# Patient Record
Sex: MALE | Race: BLACK OR AFRICAN AMERICAN | Employment: FULL TIME | URBAN - METROPOLITAN AREA
[De-identification: names, ages, dates, MRNs, and addresses within clinical notes are randomized per-mention and may not be internally consistent; named-entity substitution may affect disease eponyms.]

---

## 2017-11-08 ENCOUNTER — ANESTHESIA EVENT (OUTPATIENT)
Dept: MEDSURG UNIT | Age: 52
End: 2017-11-08
Payer: COMMERCIAL

## 2017-11-09 ENCOUNTER — ANESTHESIA (OUTPATIENT)
Dept: MEDSURG UNIT | Age: 52
End: 2017-11-09
Payer: COMMERCIAL

## 2017-11-09 ENCOUNTER — HOSPITAL ENCOUNTER (OUTPATIENT)
Age: 52
Setting detail: OUTPATIENT SURGERY
Discharge: HOME | End: 2017-11-09
Attending: PODIATRIST | Admitting: PODIATRIST
Payer: COMMERCIAL

## 2017-11-09 VITALS
HEIGHT: 70 IN | RESPIRATION RATE: 16 BRPM | DIASTOLIC BLOOD PRESSURE: 79 MMHG | TEMPERATURE: 97.6 F | HEART RATE: 55 BPM | WEIGHT: 250 LBS | SYSTOLIC BLOOD PRESSURE: 123 MMHG | BODY MASS INDEX: 35.79 KG/M2 | OXYGEN SATURATION: 98 %

## 2017-11-09 PROBLEM — M20.42 HAMMER TOE OF SECOND TOE OF LEFT FOOT: Status: RESOLVED | Noted: 2017-11-09 | Resolved: 2017-11-09

## 2017-11-09 PROBLEM — M20.42 HAMMER TOE OF SECOND TOE OF LEFT FOOT: Status: ACTIVE | Noted: 2017-11-09

## 2017-11-09 LAB
ATRIAL RATE: 61 BPM
CALCULATED P AXIS, ECG09: 64 DEGREES
CALCULATED R AXIS, ECG10: 31 DEGREES
CALCULATED T AXIS, ECG11: 16 DEGREES
DIAGNOSIS, 93000: NORMAL
HGB BLD-MCNC: 12.8 G/DL (ref 12.1–17)
P-R INTERVAL, ECG05: 162 MS
Q-T INTERVAL, ECG07: 388 MS
QRS DURATION, ECG06: 84 MS
QTC CALCULATION (BEZET), ECG08: 390 MS
VENTRICULAR RATE, ECG03: 61 BPM

## 2017-11-09 PROCEDURE — 77030002916 HC SUT ETHLN J&J -A: Performed by: PODIATRIST

## 2017-11-09 PROCEDURE — 74011000250 HC RX REV CODE- 250

## 2017-11-09 PROCEDURE — 74011250636 HC RX REV CODE- 250/636

## 2017-11-09 PROCEDURE — 76210000050 HC AMBSU PH II REC 0.5 TO 1 HR: Performed by: PODIATRIST

## 2017-11-09 PROCEDURE — 74011250636 HC RX REV CODE- 250/636: Performed by: ANESTHESIOLOGY

## 2017-11-09 PROCEDURE — 76210000034 HC AMBSU PH I REC 0.5 TO 1 HR: Performed by: PODIATRIST

## 2017-11-09 PROCEDURE — 74011250636 HC RX REV CODE- 250/636: Performed by: PODIATRIST

## 2017-11-09 PROCEDURE — 77030009023 HC CAP PROTCT PIN MCRA -A: Performed by: PODIATRIST

## 2017-11-09 PROCEDURE — 77030018836 HC SOL IRR NACL ICUM -A: Performed by: PODIATRIST

## 2017-11-09 PROCEDURE — 77030010396 HC WRE FIX C CNMD -A: Performed by: PODIATRIST

## 2017-11-09 PROCEDURE — 77030020782 HC GWN BAIR PAWS FLX 3M -B

## 2017-11-09 PROCEDURE — 93005 ELECTROCARDIOGRAM TRACING: CPT

## 2017-11-09 PROCEDURE — 77030031139 HC SUT VCRL2 J&J -A: Performed by: PODIATRIST

## 2017-11-09 PROCEDURE — 74011000250 HC RX REV CODE- 250: Performed by: PODIATRIST

## 2017-11-09 PROCEDURE — 77030006831 HC BLD SAW SAG MCRA -B: Performed by: PODIATRIST

## 2017-11-09 PROCEDURE — C1713 ANCHOR/SCREW BN/BN,TIS/BN: HCPCS | Performed by: PODIATRIST

## 2017-11-09 PROCEDURE — 85018 HEMOGLOBIN: CPT

## 2017-11-09 PROCEDURE — 77030020754 HC CUF TRNQT 2BLA STRY -B: Performed by: PODIATRIST

## 2017-11-09 PROCEDURE — 76060000061 HC AMB SURG ANES 0.5 TO 1 HR: Performed by: PODIATRIST

## 2017-11-09 PROCEDURE — 76030000000 HC AMB SURG OR TIME 0.5 TO 1: Performed by: PODIATRIST

## 2017-11-09 PROCEDURE — 77030036687 HC SHOE PSTOP S2SG -A

## 2017-11-09 DEVICE — IMPLANTABLE DEVICE: Type: IMPLANTABLE DEVICE | Status: FUNCTIONAL

## 2017-11-09 RX ORDER — ONDANSETRON 2 MG/ML
INJECTION INTRAMUSCULAR; INTRAVENOUS AS NEEDED
Status: DISCONTINUED | OUTPATIENT
Start: 2017-11-09 | End: 2017-11-09 | Stop reason: HOSPADM

## 2017-11-09 RX ORDER — ONDANSETRON 2 MG/ML
4 INJECTION INTRAMUSCULAR; INTRAVENOUS AS NEEDED
Status: DISCONTINUED | OUTPATIENT
Start: 2017-11-09 | End: 2017-11-09 | Stop reason: HOSPADM

## 2017-11-09 RX ORDER — AMLODIPINE BESYLATE 5 MG/1
5 TABLET ORAL DAILY
COMMUNITY

## 2017-11-09 RX ORDER — SODIUM CHLORIDE 0.9 % (FLUSH) 0.9 %
5-10 SYRINGE (ML) INJECTION AS NEEDED
Status: DISCONTINUED | OUTPATIENT
Start: 2017-11-09 | End: 2017-11-09 | Stop reason: HOSPADM

## 2017-11-09 RX ORDER — AMLODIPINE, VALSARTAN AND HYDROCHLOROTHIAZIDE 10; 160; 12.5 MG/1; MG/1; MG/1
TABLET ORAL
Status: ON HOLD | COMMUNITY
End: 2017-11-09

## 2017-11-09 RX ORDER — LIDOCAINE HYDROCHLORIDE 10 MG/ML
0.1 INJECTION, SOLUTION EPIDURAL; INFILTRATION; INTRACAUDAL; PERINEURAL AS NEEDED
Status: DISCONTINUED | OUTPATIENT
Start: 2017-11-09 | End: 2017-11-09 | Stop reason: HOSPADM

## 2017-11-09 RX ORDER — FENTANYL CITRATE 50 UG/ML
25 INJECTION, SOLUTION INTRAMUSCULAR; INTRAVENOUS
Status: DISCONTINUED | OUTPATIENT
Start: 2017-11-09 | End: 2017-11-09 | Stop reason: HOSPADM

## 2017-11-09 RX ORDER — SODIUM CHLORIDE 0.9 % (FLUSH) 0.9 %
5-10 SYRINGE (ML) INJECTION EVERY 8 HOURS
Status: DISCONTINUED | OUTPATIENT
Start: 2017-11-09 | End: 2017-11-09 | Stop reason: HOSPADM

## 2017-11-09 RX ORDER — HYDROMORPHONE HYDROCHLORIDE 1 MG/ML
0.2 INJECTION, SOLUTION INTRAMUSCULAR; INTRAVENOUS; SUBCUTANEOUS
Status: DISCONTINUED | OUTPATIENT
Start: 2017-11-09 | End: 2017-11-09 | Stop reason: HOSPADM

## 2017-11-09 RX ORDER — LISINOPRIL 10 MG/1
TABLET ORAL DAILY
COMMUNITY

## 2017-11-09 RX ORDER — SODIUM CHLORIDE, SODIUM LACTATE, POTASSIUM CHLORIDE, CALCIUM CHLORIDE 600; 310; 30; 20 MG/100ML; MG/100ML; MG/100ML; MG/100ML
50 INJECTION, SOLUTION INTRAVENOUS CONTINUOUS
Status: DISCONTINUED | OUTPATIENT
Start: 2017-11-09 | End: 2017-11-09 | Stop reason: HOSPADM

## 2017-11-09 RX ORDER — OXYCODONE AND ACETAMINOPHEN 5; 325 MG/1; MG/1
1 TABLET ORAL
Qty: 20 TAB | Refills: 0 | Status: SHIPPED | OUTPATIENT
Start: 2017-11-09

## 2017-11-09 RX ORDER — MIDAZOLAM HYDROCHLORIDE 1 MG/ML
INJECTION, SOLUTION INTRAMUSCULAR; INTRAVENOUS AS NEEDED
Status: DISCONTINUED | OUTPATIENT
Start: 2017-11-09 | End: 2017-11-09 | Stop reason: HOSPADM

## 2017-11-09 RX ORDER — PROPOFOL 10 MG/ML
INJECTION, EMULSION INTRAVENOUS
Status: DISCONTINUED | OUTPATIENT
Start: 2017-11-09 | End: 2017-11-09 | Stop reason: HOSPADM

## 2017-11-09 RX ORDER — PROPOFOL 10 MG/ML
INJECTION, EMULSION INTRAVENOUS AS NEEDED
Status: DISCONTINUED | OUTPATIENT
Start: 2017-11-09 | End: 2017-11-09 | Stop reason: HOSPADM

## 2017-11-09 RX ORDER — LIDOCAINE HYDROCHLORIDE 20 MG/ML
INJECTION, SOLUTION EPIDURAL; INFILTRATION; INTRACAUDAL; PERINEURAL AS NEEDED
Status: DISCONTINUED | OUTPATIENT
Start: 2017-11-09 | End: 2017-11-09 | Stop reason: HOSPADM

## 2017-11-09 RX ORDER — CEFAZOLIN SODIUM/WATER 2 G/20 ML
2 SYRINGE (ML) INTRAVENOUS ONCE
Status: COMPLETED | OUTPATIENT
Start: 2017-11-09 | End: 2017-11-09

## 2017-11-09 RX ORDER — MORPHINE SULFATE 10 MG/ML
2 INJECTION, SOLUTION INTRAMUSCULAR; INTRAVENOUS
Status: DISCONTINUED | OUTPATIENT
Start: 2017-11-09 | End: 2017-11-09 | Stop reason: HOSPADM

## 2017-11-09 RX ORDER — FENTANYL CITRATE 50 UG/ML
INJECTION, SOLUTION INTRAMUSCULAR; INTRAVENOUS AS NEEDED
Status: DISCONTINUED | OUTPATIENT
Start: 2017-11-09 | End: 2017-11-09 | Stop reason: HOSPADM

## 2017-11-09 RX ORDER — GLYCOPYRROLATE 0.2 MG/ML
INJECTION INTRAMUSCULAR; INTRAVENOUS AS NEEDED
Status: DISCONTINUED | OUTPATIENT
Start: 2017-11-09 | End: 2017-11-09 | Stop reason: HOSPADM

## 2017-11-09 RX ADMIN — PROPOFOL 30 MG: 10 INJECTION, EMULSION INTRAVENOUS at 14:43

## 2017-11-09 RX ADMIN — MIDAZOLAM HYDROCHLORIDE 1 MG: 1 INJECTION, SOLUTION INTRAMUSCULAR; INTRAVENOUS at 14:11

## 2017-11-09 RX ADMIN — Medication 2 G: at 14:20

## 2017-11-09 RX ADMIN — SODIUM CHLORIDE, SODIUM LACTATE, POTASSIUM CHLORIDE, AND CALCIUM CHLORIDE 50 ML/HR: 600; 310; 30; 20 INJECTION, SOLUTION INTRAVENOUS at 13:40

## 2017-11-09 RX ADMIN — GLYCOPYRROLATE 0.1 MG: 0.2 INJECTION INTRAMUSCULAR; INTRAVENOUS at 14:20

## 2017-11-09 RX ADMIN — MIDAZOLAM HYDROCHLORIDE 2 MG: 1 INJECTION, SOLUTION INTRAMUSCULAR; INTRAVENOUS at 14:08

## 2017-11-09 RX ADMIN — PROPOFOL 40 MG: 10 INJECTION, EMULSION INTRAVENOUS at 14:15

## 2017-11-09 RX ADMIN — LIDOCAINE HYDROCHLORIDE 40 MG: 20 INJECTION, SOLUTION EPIDURAL; INFILTRATION; INTRACAUDAL; PERINEURAL at 14:10

## 2017-11-09 RX ADMIN — PROPOFOL 20 MG: 10 INJECTION, EMULSION INTRAVENOUS at 14:26

## 2017-11-09 RX ADMIN — MIDAZOLAM HYDROCHLORIDE 2 MG: 1 INJECTION, SOLUTION INTRAMUSCULAR; INTRAVENOUS at 14:33

## 2017-11-09 RX ADMIN — PROPOFOL 60 MCG/KG/MIN: 10 INJECTION, EMULSION INTRAVENOUS at 14:12

## 2017-11-09 RX ADMIN — ONDANSETRON 4 MG: 2 INJECTION INTRAMUSCULAR; INTRAVENOUS at 14:43

## 2017-11-09 RX ADMIN — FENTANYL CITRATE 25 MCG: 50 INJECTION, SOLUTION INTRAMUSCULAR; INTRAVENOUS at 14:15

## 2017-11-09 RX ADMIN — PROPOFOL 20 MG: 10 INJECTION, EMULSION INTRAVENOUS at 14:21

## 2017-11-09 RX ADMIN — PROPOFOL 40 MG: 10 INJECTION, EMULSION INTRAVENOUS at 14:12

## 2017-11-09 RX ADMIN — FENTANYL CITRATE 25 MCG: 50 INJECTION, SOLUTION INTRAMUSCULAR; INTRAVENOUS at 14:08

## 2017-11-09 RX ADMIN — PROPOFOL 20 MG: 10 INJECTION, EMULSION INTRAVENOUS at 14:17

## 2017-11-09 NOTE — ROUTINE PROCESS
Patient: Magda Hernandez MRN: [id number]  SSN: [id number]   YOB: 1965  Age: 49 y.o. Sex: male     Patient is status post Procedure(s): HEMIPHALANGECTOMY 2ND LEFT TOE . Surgeon(s) and Role:     * Soco Beltran MD - Primary     * Wesley Cramer DPM - Assisting    Local/Dose/Irrigation: see [facility]                  Peripheral IV 11/09/17 Left Wrist (Active)   Site Assessment Clean, dry, & intact 11/9/2017  1:45 PM   Phlebitis Assessment 0 11/9/2017  1:45 PM   Infiltration Assessment 0 11/9/2017  1:45 PM   Dressing Status Occlusive 11/9/2017  1:45 PM                           Dressing/Packing:  Wound Foot Left-DRESSING TYPE: 4 x 4;Elastic bandage; Xeroform (11/09/17 1300)  Splint/Cast:  ]    Other:

## 2017-11-09 NOTE — ANESTHESIA POSTPROCEDURE EVALUATION
Post-Anesthesia Evaluation and Assessment    Patient: Adia Hedrick MRN: [id number]  SSN: [id number]    YOB: 1965  Age: 49 y.o. Sex: male       Cardiovascular Function/Vital Signs  Visit Vitals    /72    Pulse 74    Temp 36.4 °C (97.6 °F)    Resp 18    Ht 5' 10\" (1.778 m)    Wt 113.4 kg (250 lb)    SpO2 95%    BMI 35.87 kg/m2       Patient is status post MAC anesthesia for Procedure(s): HEMIPHALANGECTOMY 2ND LEFT TOE . Nausea/Vomiting: None    Postoperative hydration reviewed and adequate. Pain:  Pain Scale 1: Numeric (0 - 10) (11/09/17 1340)  Pain Intensity 1: 0 (11/09/17 1340)   Managed    Neurological Status:   Neuro (WDL): Within Defined Limits (11/09/17 1347)   At baseline    Mental Status and Level of Consciousness: Arousable    Pulmonary Status:   O2 Device: Nasal cannula (11/09/17 1506)   Adequate oxygenation and airway patent    Complications related to anesthesia: None    Post-anesthesia assessment completed.  No concerns    Signed By: Sajan Moore MD     November 9, 2017

## 2017-11-09 NOTE — ANESTHESIA PREPROCEDURE EVALUATION
Anesthetic History               Review of Systems / Medical History  Patient summary reviewed, nursing notes reviewed and pertinent labs reviewed    Pulmonary        Sleep apnea           Neuro/Psych              Cardiovascular    Hypertension                   GI/Hepatic/Renal                Endo/Other             Other Findings            Physical Exam    Airway  Mallampati: II  TM Distance: > 6 cm  Neck ROM: normal range of motion   Mouth opening: Normal     Cardiovascular    Rhythm: regular  Rate: normal         Dental  No notable dental hx       Pulmonary  Breath sounds clear to auscultation               Abdominal         Other Findings            Anesthetic Plan    ASA: 3  Anesthesia type: MAC          Induction: Intravenous  Anesthetic plan and risks discussed with: Patient

## 2017-11-09 NOTE — BRIEF OP NOTE
BRIEF OPERATIVE NOTE    Date of Procedure: 11/9/2017   Preoperative Diagnosis: N20.541 HAMMERTOES 2ND LEFT TOE   Postoperative Diagnosis: N20.541 HAMMERTOES 2ND LEFT TOE     Procedure(s):   HEMIPHALANGECTOMY 2ND LEFT TOE   Surgeon(s) and Role:     * Jennie Sarah MD - Primary     * Cleo Baker - Assisting         Assistant Staff:       Surgical Staff:  Circ-1: Mahesh Hamilton RN  Scrub RN-1: Romi Hernandez RN  Event Time In   Incision Start 1428   Incision Close 1452     Anesthesia: MAC   Estimated Blood Loss: 0  Specimens: * No specimens in log *   Findings: hammer toe    Complications: none  Implants: * No implants in log *K- wire

## 2017-11-09 NOTE — IP AVS SNAPSHOT
[address] 
[phone] Patient: Roshan Gross MRN: [id number] :1965 About your hospitalization You were admitted on:  2017 You last received care in the:  [facility] PACU You were discharged on:  2017 Why you were hospitalized Your primary diagnosis was:  Hammer Toe Of Second Toe Of Left Foot Things You Need To Do (next 8 weeks) Follow up with PROVIDER UNKNOWN Where:  Patient not available to ask Call Adilene Winchester MD in 3 day(s) Phone:  [phone] Where:  [address] [place] [address], [address] [place] [id number] Discharge Orders None A check carmen indicates which time of day the medication should be taken. My Medications TAKE these medications as instructed Instructions Each Dose to Equal  
 Morning Noon Evening Bedtime  
 amLODIPine 5 mg tablet Commonly known as:  Bernabe Brian Your last dose was: Your next dose is: Take 5 mg by mouth daily. 5 mg  
    
   
   
   
  
 lisinopril 10 mg tablet Commonly known as:  Paula Lights Your last dose was: Your next dose is: Take  by mouth daily. oxyCODONE-acetaminophen 5-325 mg per tablet Commonly known as:  PERCOCET Your last dose was: Your next dose is: Take 1 Tab by mouth every six (6) hours as needed for Pain. Max Daily Amount: 4 Tabs. 1 Tab Where to Get Your Medications Information on where to get these meds will be given to you by the nurse or doctor. ! Ask your nurse or doctor about these medications  
  oxyCODONE-acetaminophen 5-325 mg per tablet Discharge Instructions [address] [facility], [place], [place], [address] [place] [address] [employer] Phone: [phone]   Fax: [phone] Kiara RAJPUTP.MTramaine POST OPERATIVE INSTRUCIONS FOR FOOT SURGERY I)   DO NOT DRIVE TODAY AND FOR AS LONG AS YOU HAVE A SURGICAL SHOE OR CAST ON.  ALSO, DO NOT DRIVE IF YOUR FOOT FEELS SO UNCOMFORTABLE THAT YOU COULD HAVE DIFFICULTY OPERATING THE PEDALS. IF YOU DID NOT NEED A CAST, SURGICAL SHOE, BRACE OR SPLINT, THEN DO NOT DRIVE UNTIL ALLOF THE LOCAL ANESTHETIC HAS WORN OFF. THIS IS USUALLY 18 HOURS. 2)   KEEP YOUR OPERATIVE FOOT CLEAN AND DRY. YOU SHOULD COVER IT WITH A PLASTIC BAG WHEN OUTDOORS IN WET OR RAINY CONDITIONS. DO NOT TAKE A SHOWER WITH A CAST, DRESSING,  ETC. ON- A PLASTIC BAG IS NOT EFFECTIVE IN KEEPING A FOOT DRY IN A SHOWER- IT WILL LEAK. 3)   YOU MAY TAKE A TUB BATH AND HANG THE FOOT OUT OF THE TUB OR TAKE A SPONGE BATH. 4)   APPLY ICE AND ELEVATE THE FOOT ABOVE THE LEVEL OF THE HEART AFTER SURGERY. THIS WILL REDUCE PAIN, SWELLING, AND HEAT. FOR MINOR NATL OR SOFT TISSUE SURGERY, YOU MAY NEED TO DO THIS FOR ONLY ONE OR TWO DAYS. FOR BONE OR OTHER MAJOR SURGERIES,  THIS MAY REQUIRE FOUR OR FIVE DAYS. DO NOT DISTRUB THE DRESSING UNLESS THE DOCTOR HAS INSTRUCTED YOU OTHERWISE. 5)   GO STRAIGHT HOME AFTER OUT PATTENT SURGERY. HAVE SOMEONE ELSE DRIVE. 6)   TAKE YOUR MEDICATIONS AS DIRECTED. TAKE PAIN MEDICATIONS ONLY AS YOU NEED THEM. IF YOU TAKE THE PAIN MEDIATJON, THEN DO NOT DRINK ALCOHOL, DRIVE OR WORK IN SITUATIONS WHERE SLOW REFLEXES COULD PUT YOU OR OTHERS IN DANGER. YOU MAY TAKE 
ASPIRIN OR ACETAMINOPHEN (TYLENOL) INSTEAD OF THE PRESCRIBED MEDICATIONS IF THE DISCOMFORT IS ONLY MILD. 7)   DO NOT STICK ANYTHING DOWN THE CAST- KEEP IT DRY- DO NOT PICK AT THE COTTON PADDING. YOU ARE TO WEAR A SOCK OVER THE END OF YOUR CAST OR DRESSING. PAGE 2- POST OPERATIVE INSTRUCTIONS 8)   IF YOU HAVE A WALKING CAST THAT HAS BEEN JUST APPLIED, YOU MUST NOT PUT ANY PRESSURE ON IT FOR 36 TO 48 HOURS. IT MAY FEEL DRY AND SEEM STRONG BUT IT HAS NOT YET CURED-USE CRUTCHES  UNTIL THEN. IF IT IS A NON-WALKING CAST, PUT NO WEIGHT ON IT AT ALL. 9)   KEEP A WATCI-IFUL EYE ON THE COLOR OF YOUR TOES IF THEY ARE EXPOSED. IF THEY TURN WHITE, PURPLE, OR  DUSKY AND THIS PERSISTS LONGER THAN AN HOUR OR SO, THEN YOU SHOULD SEE THE DOCTOR IMMEDIATELY. A SLIGHT AMOUNT OF BLOOD ON THE CAST OR DRESSING IS NORMAL. IF THE DRESSING IS SATURATED WITH BLOOD, THEN YOU SHOULD CALL THE DOCTOR. 10) A SUDDEN INCREASE IN PAIN, SWELLING OF THE WHOLE FOOT, 
_               EXCESSIVE HEAT, REDSTREAKS UP THE FOOFOR LEG, CALF PAIN, OR TENDER LUMPS BEHIND THE KNEE OR IN THE GROIN, SEVERE BLEEDING OR FEELING ILL MAY BE SERIOUS SIGNS AND YOU SHOULD CONTACT YOUR DOCTOR IMMEDIATELY. I 1) IF THE CAST SEEMS EXCESSIVELY TIGHT OR PAINFUL AND DOES NOT IMPROVE WITH ELEVATION,  THEN CALL THE DOCTOR. 12) RETURN FOR SURGICAL FOLLOW UP AS DIRECTED. YOUR FIRST VISIT AFTER SURGERY IS USUALLY WITHIN FOUR TO FIVE DAYS. FOLLOWING THESE INSTRUCTIONS WILL ALLOW YOU BETTER SURGICAL RESULTS, REDUCE PAIN, LIMIT COMPLICATIONS, AND ALLOW YOUR WOUND TO HEAL RAPIDLY. IN CASE OF AN EMERGENCY CONTACT THE DOCTOR ON THE BEEPER: [phone] DISCHARGE SUMMARY from Nurse PATIENT INSTRUCTIONS: 
 
 
F-face looks uneven A-arms unable to move or move unevenly S-speech slurred or non-existent T-time-call 911 as soon as signs and symptoms begin-DO NOT go Back to bed or wait to see if you get better-TIME IS BRAIN. Warning Signs of HEART ATTACK Call 911 if you have these symptoms: ? Chest discomfort. Most heart attacks involve discomfort in the center of the chest that lasts more than a few minutes, or that goes away and comes back. It can feel like uncomfortable pressure, squeezing, fullness, or pain. ? Discomfort in other areas of the upper body. Symptoms can include pain or discomfort in one or both arms, the back, neck, jaw, or stomach. ? Shortness of breath with or without chest discomfort. ? Other signs may include breaking out in a cold sweat, nausea, or lightheadedness. Don't wait more than five minutes to call [address]! Fast action can save your life. Calling 911 is almost always the fastest way to get lifesaving treatment. Emergency Medical Services staff can begin treatment when they arrive  up to an hour sooner than if someone gets to the hospital by car. The discharge information has been reviewed with the patient and spouse. The patient and spouse verbalized understanding. Discharge medications reviewed with the patient and spouse and appropriate educational materials and side effects teaching were provided. ___________________________________________________________________________________________________________________________________ Introducing [facility]! [facility] introduces [employer] patient portal. Now you can access parts of your medical record, email your doctor's office, and request medication refills online. 1. In your internet browser, go to [email or web address] 2. Click on the First Time User? Click Here link in the Sign In box. You will see the New Member Sign Up page. 3. Enter your [employer] Access Code exactly as it appears below. You will not need to use this code after youve completed the sign-up process. If you do not sign up before the expiration date, you must request a new code. · [employer] Access Code: [email or web address] Expires: 2/7/2018  3:35 PM 
 
 4. Enter the last four digits of your Social Security Number (xxxx) and Date of Birth (mm/dd/yyyy) as indicated and click Submit. You will be taken to the next sign-up page. 5. Create a [employer] ID. This will be your [employer] login ID and cannot be changed, so think of one that is secure and easy to remember. 6. Create a [employer] password. You can change your password at any time. 7. Enter your Password Reset Question and Answer. This can be used at a later time if you forget your password. 8. Enter your e-mail address. You will receive e-mail notification when new information is available in [address] 9. Click Sign Up. You can now view and download portions of your medical record. 10. Click the Download Summary menu link to download a portable copy of your medical information. If you have questions, please visit the Frequently Asked Questions section of the [employer] website. Remember, [employer] is NOT to be used for urgent needs. For medical emergencies, dial 911. Now available from your iPhone and Android! Providers Seen During Your Hospitalization Provider Specialty Primary office phone Shayy Mahoney MD Podiatry [phone] Your Primary Care Physician (PCP) Primary Care Physician Office Phone Office Fax UNKNOWN, PROVIDER ** None ** ** None ** You are allergic to the following No active allergies Recent Documentation Height Weight BMI Smoking Status 1.778 m 113.4 kg 35.87 kg/m2 Never Smoker Emergency Contacts Name Discharge Info Relation Home Work Mobile Astrid Abdullahi DISCHARGE CAREGIVER [3] Spouse [3]   [phone] Patient Belongings The following personal items are in your possession at time of discharge: 
  Dental Appliances: None  Visual Aid: Glasses             Clothing:  (Clothes in locker) Please provide this summary of care documentation to your next provider. Signatures-by signing, you are acknowledging that this After Visit Summary has been reviewed with you and you have received a copy. Patient Signature:  ____________________________________________________________ Date:  ____________________________________________________________  
  
Aloma Snellen Provider Signature:  ____________________________________________________________ Date:  ____________________________________________________________

## 2017-11-09 NOTE — DISCHARGE INSTRUCTIONS
[address] [facility], [address], [facility], [address]        Phone: [phone]   Fax: [phone]    [employer] RAJPUTP.M. POST OPERATIVE INSTRUCIONS FOR FOOT SURGERY    I)   DO NOT DRIVE TODAY AND FOR AS LONG AS YOU HAVE A SURGICAL SHOE OR CAST ON.  ALSO, DO NOT DRIVE IF YOUR FOOT FEELS SO UNCOMFORTABLE THAT YOU COULD HAVE DIFFICULTY OPERATING THE PEDALS. IF YOU DID NOT NEED A CAST, SURGICAL SHOE, BRACE OR SPLINT, THEN DO NOT DRIVE UNTIL ALLOF THE LOCAL ANESTHETIC HAS WORN OFF. THIS IS USUALLY 18 HOURS. 2)   KEEP YOUR OPERATIVE FOOT CLEAN AND DRY. YOU SHOULD COVER IT WITH A PLASTIC BAG WHEN OUTDOORS IN WET OR RAINY CONDITIONS. DO NOT TAKE A SHOWER WITH A CAST, DRESSING,  ETC. ON- A PLASTIC BAG IS NOT EFFECTIVE IN KEEPING A FOOT DRY IN A SHOWER- IT WILL LEAK. 3)   YOU MAY TAKE A TUB BATH AND HANG THE FOOT OUT OF THE TUB OR TAKE A SPONGE BATH. 4)   APPLY ICE AND ELEVATE THE FOOT ABOVE THE LEVEL OF THE HEART AFTER SURGERY. THIS WILL REDUCE PAIN, SWELLING, AND HEAT. FOR MINOR NATL OR SOFT TISSUE SURGERY, YOU MAY NEED TO DO THIS FOR ONLY ONE OR TWO DAYS. FOR BONE OR OTHER MAJOR SURGERIES,  THIS MAY REQUIRE FOUR OR FIVE DAYS. DO NOT DISTRUB THE DRESSING UNLESS THE DOCTOR HAS INSTRUCTED YOU OTHERWISE. 5)   GO STRAIGHT HOME AFTER OUT PATTENT SURGERY. HAVE SOMEONE ELSE DRIVE. 6)   TAKE YOUR MEDICATIONS AS DIRECTED. TAKE PAIN MEDICATIONS ONLY AS YOU NEED THEM. IF YOU TAKE THE PAIN MEDIATJON, THEN DO NOT DRINK ALCOHOL, DRIVE OR WORK IN SITUATIONS WHERE SLOW  REFLEXES COULD PUT YOU OR OTHERS IN DANGER. YOU MAY TAKE  ASPIRIN OR ACETAMINOPHEN (TYLENOL) INSTEAD OF THE PRESCRIBED MEDICATIONS IF THE DISCOMFORT IS ONLY MILD. 7)   DO NOT STICK ANYTHING DOWN THE CAST- KEEP IT DRY- DO NOT PICK AT THE COTTON PADDING. YOU ARE TO WEAR A SOCK OVER THE END OF YOUR CAST OR DRESSING.   PAGE 2- POST OPERATIVE INSTRUCTIONS    8) IF YOU HAVE A WALKING CAST THAT HAS BEEN JUST APPLIED, YOU MUST NOT PUT ANY PRESSURE ON IT FOR 36 TO 48 HOURS. IT MAY FEEL DRY AND SEEM STRONG BUT IT HAS NOT YET CURED-USE CRUTCHES  UNTIL THEN. IF IT IS A NON-WALKING CAST, PUT NO WEIGHT ON IT AT ALL. 9)   KEEP A WATCI-IFUL EYE ON THE COLOR OF YOUR TOES IF THEY ARE EXPOSED. IF THEY TURN WHITE, PURPLE, OR  DUSKY AND THIS PERSISTS LONGER THAN AN HOUR OR SO, THEN YOU SHOULD SEE THE DOCTOR IMMEDIATELY. A SLIGHT AMOUNT OF BLOOD ON THE CAST OR DRESSING IS NORMAL. IF THE DRESSING IS SATURATED WITH BLOOD, THEN YOU SHOULD CALL THE DOCTOR. 10) A SUDDEN INCREASE IN PAIN, SWELLING OF THE WHOLE FOOT,  _               EXCESSIVE HEAT, REDSTREAKS UP THE FOOFOR LEG, CALF PAIN, OR TENDER LUMPS BEHIND THE KNEE OR IN THE GROIN, SEVERE BLEEDING OR FEELING ILL MAY BE SERIOUS SIGNS AND YOU SHOULD CONTACT YOUR DOCTOR IMMEDIATELY. I 1) IF THE CAST SEEMS EXCESSIVELY TIGHT OR PAINFUL AND DOES NOT IMPROVE WITH ELEVATION,  THEN CALL THE DOCTOR. 12) RETURN FOR SURGICAL FOLLOW UP AS DIRECTED. YOUR FIRST VISIT AFTER SURGERY IS USUALLY WITHIN FOUR TO FIVE DAYS. FOLLOWING THESE INSTRUCTIONS WILL ALLOW YOU BETTER SURGICAL RESULTS, REDUCE PAIN, LIMIT COMPLICATIONS, AND ALLOW YOUR WOUND TO HEAL RAPIDLY. IN CASE OF AN EMERGENCY CONTACT THE DOCTOR ON THE BEEPER: [phone]            DISCHARGE SUMMARY from Nurse    PATIENT INSTRUCTIONS:    After general anesthesia or intravenous sedation, for 24 hours or while taking prescription Narcotics:  · Limit your activities  · Do not drive and operate hazardous machinery  · Do not make important personal or business decisions  · Do  not drink alcoholic beverages  · If you have not urinated within 8 hours after discharge, please contact your surgeon on call.     Report the following to your surgeon:  · Excessive pain, swelling, redness or odor of or around the surgical area  · Temperature over 100.5  · Nausea and vomiting lasting longer than 4 hours or if unable to take medications  · Any signs of decreased circulation or nerve impairment to extremity: change in color, persistent  numbness, tingling, coldness or increase pain  · Any questions    What to do at Home:  Recommended activity: Activity as tolerated and no driving for today and No driving while on analgesics,     If you experience any of the following symptoms ; as noted above, please follow up with . *  Please give a list of your current medications to your Primary Care Provider. *  Please update this list whenever your medications are discontinued, doses are      changed, or new medications (including over-the-counter products) are added. *  Please carry medication information at all times in case of emergency situations. These are general instructions for a healthy lifestyle:    No smoking/ No tobacco products/ Avoid exposure to second hand smoke  Surgeon General's Warning:  Quitting smoking now greatly reduces serious risk to your health. Obesity, smoking, and sedentary lifestyle greatly increases your risk for illness    A healthy diet, regular physical exercise & weight monitoring are important for maintaining a healthy lifestyle    You may be retaining fluid if you have a history of heart failure or if you experience any of the following symptoms:  Weight gain of 3 pounds or more overnight or 5 pounds in a week, increased swelling in our hands or feet or shortness of breath while lying flat in bed. Please call your doctor as soon as you notice any of these symptoms; do not wait until your next office visit. Recognize signs and symptoms of STROKE:    F-face looks uneven    A-arms unable to move or move unevenly    S-speech slurred or non-existent    T-time-call 911 as soon as signs and symptoms begin-DO NOT go       Back to bed or wait to see if you get better-TIME IS BRAIN.     Warning Signs of HEART ATTACK     Call 911 if you have these symptoms:   Chest discomfort. Most heart attacks involve discomfort in the center of the chest that lasts more than a few minutes, or that goes away and comes back. It can feel like uncomfortable pressure, squeezing, fullness, or pain.  Discomfort in other areas of the upper body. Symptoms can include pain or discomfort in one or both arms, the back, neck, jaw, or stomach.  Shortness of breath with or without chest discomfort.  Other signs may include breaking out in a cold sweat, nausea, or lightheadedness. Don't wait more than five minutes to call 911 - MINUTES MATTER! Fast action can save your life. Calling 911 is almost always the fastest way to get lifesaving treatment. Emergency Medical Services staff can begin treatment when they arrive -- up to an hour sooner than if someone gets to the hospital by car. The discharge information has been reviewed with the patient and spouse. The patient and spouse verbalized understanding. Discharge medications reviewed with the patient and spouse and appropriate educational materials and side effects teaching were provided.   ___________________________________________________________________________________________________________________________________

## 2017-11-09 NOTE — OP NOTES
[address] [place] [address], [address] [place] [address]   OP NOTE       Name:  James Oscar   MR#:  [id number]   :  1965   Account #:  [de-identified]    Surgery Date:  2017   Date of Adm:  2017       PREOPERATIVE DIAGNOSIS: Chronic and painful hammertoe   deformity of the left second toe. POSTOPERATIVE DIAGNOSIS: Chronic and painful hammertoe   deformity of the left second toe. PROCEDURES PERFORMED   1. Arthroplasty at the proximal interphalangeal joint of the  Left second   toe. 2. Medial hemiphalangectomy of the middle phalanx of the left second   toe. SURGEON: Kori Ybarra. Harry Cervantes DPM.    FIRST ASSISTANT: Jonathan Perry DPM.     SECOND ASSISTANT: None. ANESTHESIA: Marcaine/Xylocaine mixture along with IV sedation. ANESTHESIOLOGIST: Dr. Josy Rodarte  along with the nurse anesthetist.     SCRUB NURSE: Todd Leiva. CIRCULATING NURSE: Cesar Butler. COMPLICATIONS: None. ESTIMATED BLOOD LOSS: 0 mL. TOURNIQUET: Ankle tourniquet, inflated to 250 mmHg pressure. TOURNIQUET TIME: Approximately 25 minutes. SPECIMENS REMOVED:  Bone of the left PIPJ. INDICATION FOR SURGERY: This 56-year-old gentleman presents   with a history of very chronic and painful hammertoe deformity of the   left second toe. The patient states the condition has  Been problematic for many   months, causing him debilitation and pain upon ambulating when   wearing shoes. Conservative therapy such as debridement of a painful   ulceration of the second toe along with use of splints and over-the-  counter inserts have not remedied his condition asymptomatic. The   patient is electing for surgical intervention. DESCRIPTION OF PROCEDURE: After there was no contraindication   to this patient having surgery he was brought to [address] [place] [address] in   the ambulatory unit and placed in a dorsal recumbent position.    Anesthesia was administered using Marcaine/Xylocaine mixture,   infiltrated on the base of the left second toe, and also IV anesthesia   was administered by the Anesthesiology Department. Approximately 9   mL of Marcaine/Xylocaine mixture was utilized for injection. The left   foot was then prepped and draped in the usual sterile manner. Hemostasis was achieved using an ankle tourniquet inflated to 250   mmHg pressure. The foot was then fully exsanguinated. Attention was   then directed to the dorsal aspect of the left second toe, where a 3.5   dorsal linear incision was then made directly over the joint area. The   incision was then deepened using sharp and blunt dissection, being   careful to preserve and protect all neurovascular structures. All   bleeders were noted and Bovie was used in the usual fashion. The   capsular structures were then noted. A transverse capsulotomy was   then performed directly into the proximal interphalangeal joint of the left   second toe,severing  both lateral and collateral ligaments, the head of the   proximal phalanx was then noted. Using micro oscillating saw, the   head of the proximal phalanx of the left second toe was then excised   and removed from the wound. The remaining area was then rasped   and rongeured to a smooth contour and then flushed with sterile saline. Attention was directed to the middle phalanx where the capsular   structures were then dissected sharply and retracted dorsally and   medially. Using micro oscillating saw, the medial aspect of the middle   phalanx was then excised and removed from the wound. All the   remaining area was then rasped and rongeured to a smooth contour   and flushed with the sterile saline. The foot was then flushed with   saline solution and suctioned out. A 4.3 K-wire was then used to hold   the bones together and consolidate the toe. The toe was then in   alignment at this point and then the distal tuft was then  fixated dorsally and a wire cap was then applied.  The area was then   thoroughly flushed and irrigated with saline solution. All capsular   tendinous structures were then approximated using 4-0 Vicryl in simple   interrupted type fashion and the skin was then closed using 5-0 nylon   in horizontal type mattress stitch. Approximately 0.5 mL of   dexamethasone mixed with 2cc 0.5% Marcaine was instilled throughout   the wounds. Dressing  Consisted of  Xeroform gauze,  Plain 3 x 3's, 3-inch Geoffrey, and   3-inch Ace bandage. The tourniquet was released and perfusion to all   digits was noted. The patient tolerated the anesthesia and procedure well and left the   operating room for the recovery room in fine condition where he was   advised of postoperative care, i.e., ice, elevation for the first 72 hours,   pain medications, Percocet 5/325 mg of oxycodone/acetaminophen. The patient has 20 pills dispensed. The patient advised to take them   q.4-6h. as needed for pain. The patient was advised of strict   postoperative instructions and will follow up in the office in 3 days.         GABBI Maradiaga / Daiana Lee   D:  11/09/2017   15:31   T:  11/09/2017   16:11   Job #:  [id number]

## 2024-11-25 ENCOUNTER — TELEPHONE (OUTPATIENT)
Dept: PRIMARY CARE CLINIC | Facility: CLINIC | Age: 59
End: 2024-11-25

## 2024-11-25 ENCOUNTER — OFFICE VISIT (OUTPATIENT)
Dept: PRIMARY CARE CLINIC | Facility: CLINIC | Age: 59
End: 2024-11-25
Payer: COMMERCIAL

## 2024-11-25 VITALS
HEART RATE: 74 BPM | TEMPERATURE: 98.4 F | HEIGHT: 69 IN | OXYGEN SATURATION: 98 % | DIASTOLIC BLOOD PRESSURE: 86 MMHG | RESPIRATION RATE: 16 BRPM | SYSTOLIC BLOOD PRESSURE: 153 MMHG | BODY MASS INDEX: 37.44 KG/M2 | WEIGHT: 252.8 LBS

## 2024-11-25 DIAGNOSIS — J30.9 ALLERGIC RHINITIS WITH POSTNASAL DRIP: ICD-10-CM

## 2024-11-25 DIAGNOSIS — L40.50 PSORIATIC ARTHRITIS (HCC): Primary | ICD-10-CM

## 2024-11-25 DIAGNOSIS — I10 HYPERTENSION, UNSPECIFIED TYPE: ICD-10-CM

## 2024-11-25 DIAGNOSIS — M72.2 PLANTAR FASCIITIS, BILATERAL: ICD-10-CM

## 2024-11-25 DIAGNOSIS — R09.82 ALLERGIC RHINITIS WITH POSTNASAL DRIP: ICD-10-CM

## 2024-11-25 PROCEDURE — 99204 OFFICE O/P NEW MOD 45 MIN: CPT | Performed by: STUDENT IN AN ORGANIZED HEALTH CARE EDUCATION/TRAINING PROGRAM

## 2024-11-25 PROCEDURE — 3079F DIAST BP 80-89 MM HG: CPT | Performed by: STUDENT IN AN ORGANIZED HEALTH CARE EDUCATION/TRAINING PROGRAM

## 2024-11-25 PROCEDURE — 3077F SYST BP >= 140 MM HG: CPT | Performed by: STUDENT IN AN ORGANIZED HEALTH CARE EDUCATION/TRAINING PROGRAM

## 2024-11-25 RX ORDER — APREMILAST 30 MG/1
TABLET, FILM COATED ORAL
COMMUNITY
Start: 2024-08-14

## 2024-11-25 RX ORDER — AMLODIPINE AND BENAZEPRIL HYDROCHLORIDE 10; 40 MG/1; MG/1
1 CAPSULE ORAL DAILY
Qty: 90 CAPSULE | Refills: 0 | Status: CANCELLED | OUTPATIENT
Start: 2024-11-25

## 2024-11-25 RX ORDER — AMLODIPINE AND BENAZEPRIL HYDROCHLORIDE 10; 40 MG/1; MG/1
CAPSULE ORAL
COMMUNITY
Start: 2024-02-05

## 2024-11-25 SDOH — HEALTH STABILITY: PHYSICAL HEALTH: ON AVERAGE, HOW MANY DAYS PER WEEK DO YOU ENGAGE IN MODERATE TO STRENUOUS EXERCISE (LIKE A BRISK WALK)?: 3 DAYS

## 2024-11-25 SDOH — HEALTH STABILITY: PHYSICAL HEALTH: ON AVERAGE, HOW MANY MINUTES DO YOU ENGAGE IN EXERCISE AT THIS LEVEL?: 70 MIN

## 2024-11-25 ASSESSMENT — PATIENT HEALTH QUESTIONNAIRE - PHQ9
2. FEELING DOWN, DEPRESSED OR HOPELESS: NOT AT ALL
SUM OF ALL RESPONSES TO PHQ QUESTIONS 1-9: 0
SUM OF ALL RESPONSES TO PHQ QUESTIONS 1-9: 0
1. LITTLE INTEREST OR PLEASURE IN DOING THINGS: NOT AT ALL
SUM OF ALL RESPONSES TO PHQ9 QUESTIONS 1 & 2: 0
SUM OF ALL RESPONSES TO PHQ QUESTIONS 1-9: 0
SUM OF ALL RESPONSES TO PHQ QUESTIONS 1-9: 0

## 2024-11-25 NOTE — TELEPHONE ENCOUNTER
Patient would like mediation from today's visit sent to [facility] at [address], [place] [address]       Please further assit

## 2024-11-25 NOTE — PROGRESS NOTES
Jordy Marcus (:  1965) is a 59 y.o. male,New patient, here for evaluation of the following chief complaint(s):  New Patient (Establish care. Sinus drainage and may need to comeback to do lab work. )         Assessment & Plan  Psoriatic arthritis (HCC)  History of psoriatic arthritis, is on DMARD (Otezla).  Followed by rheumatology specialist.    Orders:    CBC with Auto Differential    Comprehensive Metabolic Panel    Hypertension, unspecified type    Elevated blood pressure at this visit, noted to be on medication.  Plan for blood pressure recheck in 4 weeks to consider further medical management after labs are required.    Orders:    Lipid Panel    Allergic rhinitis with postnasal drip    Noted chronic allergic sinusitis with postnasal drip.  Will send Flonase to assist.    Orders:    fluticasone (FLONASE) 50 MCG/ACT nasal spray; 2 sprays by Each Nostril route daily    Plantar fasciitis, bilateral    Ultrasound and further workup in 4 weeks           No follow-ups on file.       Subjective   HPI  Patient is a very pleasant 59-year-old male.  He works as a teacher. He notes he has had  plantar fascitis for the last 3-4 months.  Notes that begins with the first step on the floor.  History of psoriatric arthritis.  He also notes that he has been having symptoms of chronic congestion and post-nasal drip.   Review of Systems       Objective   Physical Exam  Vitals and nursing note reviewed.   Constitutional:       Appearance: Normal appearance. He is normal weight.   HENT:      Head: Normocephalic and atraumatic.      Mouth/Throat:      Mouth: Mucous membranes are moist.      Pharynx: Oropharynx is clear.   Cardiovascular:      Rate and Rhythm: Normal rate and regular rhythm.      Heart sounds: Normal heart sounds.   Pulmonary:      Effort: Pulmonary effort is normal. No respiratory distress.      Breath sounds: Normal breath sounds.   Neurological:      Mental Status: He is alert.   Psychiatric:

## 2024-11-26 LAB
ALBUMIN SERPL-MCNC: 4.6 G/DL (ref 3.8–4.9)
ALP SERPL-CCNC: 91 IU/L (ref 44–121)
ALT SERPL-CCNC: 22 IU/L (ref 0–44)
AST SERPL-CCNC: 27 IU/L (ref 0–40)
BASOPHILS # BLD AUTO: 0 X10E3/UL (ref 0–0.2)
BASOPHILS NFR BLD AUTO: 0 %
BILIRUB SERPL-MCNC: 0.4 MG/DL (ref 0–1.2)
BUN SERPL-MCNC: 12 MG/DL (ref 6–24)
BUN/CREAT SERPL: 10 (ref 9–20)
CALCIUM SERPL-MCNC: 9.4 MG/DL (ref 8.7–10.2)
CHLORIDE SERPL-SCNC: 101 MMOL/L (ref 96–106)
CHOLEST SERPL-MCNC: 169 MG/DL (ref 100–199)
CO2 SERPL-SCNC: 23 MMOL/L (ref 20–29)
CREAT SERPL-MCNC: 1.16 MG/DL (ref 0.76–1.27)
EGFRCR SERPLBLD CKD-EPI 2021: 73 ML/MIN/1.73
EOSINOPHIL # BLD AUTO: 0.1 X10E3/UL (ref 0–0.4)
EOSINOPHIL NFR BLD AUTO: 2 %
ERYTHROCYTE [DISTWIDTH] IN BLOOD BY AUTOMATED COUNT: 12.6 % (ref 11.6–15.4)
GLOBULIN SER CALC-MCNC: 2.5 G/DL (ref 1.5–4.5)
GLUCOSE SERPL-MCNC: 80 MG/DL (ref 70–99)
HCT VFR BLD AUTO: 42.8 % (ref 37.5–51)
HDLC SERPL-MCNC: 47 MG/DL
HGB BLD-MCNC: 14 G/DL (ref 13–17.7)
IMM GRANULOCYTES # BLD AUTO: 0 X10E3/UL (ref 0–0.1)
IMM GRANULOCYTES NFR BLD AUTO: 0 %
LDLC SERPL CALC-MCNC: 106 MG/DL (ref 0–99)
LYMPHOCYTES # BLD AUTO: 1.2 X10E3/UL (ref 0.7–3.1)
LYMPHOCYTES NFR BLD AUTO: 26 %
MCH RBC QN AUTO: 27.9 PG (ref 26.6–33)
MCHC RBC AUTO-ENTMCNC: 32.7 G/DL (ref 31.5–35.7)
MCV RBC AUTO: 85 FL (ref 79–97)
MONOCYTES # BLD AUTO: 0.6 X10E3/UL (ref 0.1–0.9)
MONOCYTES NFR BLD AUTO: 12 %
NEUTROPHILS # BLD AUTO: 2.7 X10E3/UL (ref 1.4–7)
NEUTROPHILS NFR BLD AUTO: 60 %
PLATELET # BLD AUTO: 250 X10E3/UL (ref 150–450)
POTASSIUM SERPL-SCNC: 4.3 MMOL/L (ref 3.5–5.2)
PROT SERPL-MCNC: 7.1 G/DL (ref 6–8.5)
RBC # BLD AUTO: 5.02 X10E6/UL (ref 4.14–5.8)
SODIUM SERPL-SCNC: 139 MMOL/L (ref 134–144)
TRIGL SERPL-MCNC: 87 MG/DL (ref 0–149)
VLDLC SERPL CALC-MCNC: 16 MG/DL (ref 5–40)
WBC # BLD AUTO: 4.5 X10E3/UL (ref 3.4–10.8)

## 2024-11-26 RX ORDER — FLUTICASONE PROPIONATE 50 MCG
2 SPRAY, SUSPENSION (ML) NASAL DAILY
Qty: 16 G | Refills: 0 | Status: SHIPPED | OUTPATIENT
Start: 2024-11-26

## 2024-12-22 SDOH — ECONOMIC STABILITY: FOOD INSECURITY: WITHIN THE PAST 12 MONTHS, THE FOOD YOU BOUGHT JUST DIDN'T LAST AND YOU DIDN'T HAVE MONEY TO GET MORE.: NEVER TRUE

## 2024-12-22 SDOH — ECONOMIC STABILITY: FOOD INSECURITY: WITHIN THE PAST 12 MONTHS, YOU WORRIED THAT YOUR FOOD WOULD RUN OUT BEFORE YOU GOT MONEY TO BUY MORE.: NEVER TRUE

## 2024-12-22 SDOH — ECONOMIC STABILITY: INCOME INSECURITY: HOW HARD IS IT FOR YOU TO PAY FOR THE VERY BASICS LIKE FOOD, HOUSING, MEDICAL CARE, AND HEATING?: NOT HARD AT ALL

## 2024-12-22 SDOH — ECONOMIC STABILITY: TRANSPORTATION INSECURITY
IN THE PAST 12 MONTHS, HAS LACK OF TRANSPORTATION KEPT YOU FROM MEETINGS, WORK, OR FROM GETTING THINGS NEEDED FOR DAILY LIVING?: NO

## 2024-12-23 ENCOUNTER — OFFICE VISIT (OUTPATIENT)
Dept: PRIMARY CARE CLINIC | Facility: CLINIC | Age: 59
End: 2024-12-23
Payer: COMMERCIAL

## 2024-12-23 VITALS
DIASTOLIC BLOOD PRESSURE: 80 MMHG | HEART RATE: 69 BPM | BODY MASS INDEX: 36.73 KG/M2 | RESPIRATION RATE: 17 BRPM | SYSTOLIC BLOOD PRESSURE: 137 MMHG | TEMPERATURE: 98.4 F | HEIGHT: 69 IN | WEIGHT: 248 LBS

## 2024-12-23 DIAGNOSIS — M72.2 PLANTAR FASCIITIS, BILATERAL: Primary | ICD-10-CM

## 2024-12-23 DIAGNOSIS — L40.50 PSORIATIC ARTHRITIS (HCC): ICD-10-CM

## 2024-12-23 DIAGNOSIS — I10 HYPERTENSION, UNSPECIFIED TYPE: ICD-10-CM

## 2024-12-23 DIAGNOSIS — M79.671 BILATERAL FOOT PAIN: ICD-10-CM

## 2024-12-23 DIAGNOSIS — M79.672 BILATERAL FOOT PAIN: ICD-10-CM

## 2024-12-23 PROCEDURE — 3079F DIAST BP 80-89 MM HG: CPT | Performed by: STUDENT IN AN ORGANIZED HEALTH CARE EDUCATION/TRAINING PROGRAM

## 2024-12-23 PROCEDURE — 3075F SYST BP GE 130 - 139MM HG: CPT | Performed by: STUDENT IN AN ORGANIZED HEALTH CARE EDUCATION/TRAINING PROGRAM

## 2024-12-23 PROCEDURE — 99214 OFFICE O/P EST MOD 30 MIN: CPT | Performed by: STUDENT IN AN ORGANIZED HEALTH CARE EDUCATION/TRAINING PROGRAM

## 2024-12-23 RX ORDER — AMLODIPINE AND BENAZEPRIL HYDROCHLORIDE 10; 40 MG/1; MG/1
1 CAPSULE ORAL DAILY
Qty: 90 CAPSULE | Refills: 1 | Status: SHIPPED | OUTPATIENT
Start: 2024-12-23

## 2024-12-23 NOTE — PROGRESS NOTES
Jordy Marcus (:  1965) is a 59 y.o. male,Established patient, here for evaluation of the following chief complaint(s):  Follow-up (4 week f/u and maybe fill out paperwork for Handicap. )         Assessment & Plan  Plantar fasciitis, bilateral    Difficulty visualizing plantar fascia with ultrasound.  X-ray ordered.  Provided handout discussing exercises and symptomatic management.  Advise continuing orthopedic soles.    Orders:    XR FOOT LEFT (2 VIEWS); Future    XR FOOT RIGHT (2 VIEWS); Future    Psoriatic arthritis (HCC)    Paperwork completed for handicap placard.  Will await records from rheumatology.  Noted nabumetone sent by them that patient brought.  Discussed may also assist with plantar fasciitis symptoms.         Hypertension, unspecified type       Orders:    amLODIPine-benazepril (LOTREL) 10-40 MG per capsule; Take 1 capsule by mouth daily    Bilateral foot pain       Orders:    XR FOOT LEFT (2 VIEWS); Future    XR FOOT RIGHT (2 VIEWS); Future      No follow-ups on file.       Subjective   HPI  Patient is a pleasant 59 y.o. M. Presenting for chronic conditions:    Psoriatic Arthritis  -would like disability placard if possible, due to pain and dependent on joints  -currently taking Otlessa, provided nabumetone for flares of pain  -has requested records    Plantar Fascia  -some improvement with orthotic foot pads  -continues to have pain as a whole however    Hypertension  -needs refill on medications        Review of Systems       Objective   Physical Exam  Vitals and nursing note reviewed.   Constitutional:       Appearance: Normal appearance. He is normal weight.   HENT:      Head: Normocephalic and atraumatic.      Mouth/Throat:      Mouth: Mucous membranes are moist.      Pharynx: Oropharynx is clear.   Pulmonary:      Effort: Pulmonary effort is normal. No respiratory distress.   Musculoskeletal:      Comments: Tenderness at plantar fascia insertion site of right foot   Neurological:

## 2024-12-24 DIAGNOSIS — R09.82 ALLERGIC RHINITIS WITH POSTNASAL DRIP: ICD-10-CM

## 2024-12-24 DIAGNOSIS — J30.9 ALLERGIC RHINITIS WITH POSTNASAL DRIP: ICD-10-CM

## 2024-12-24 RX ORDER — FLUTICASONE PROPIONATE 50 MCG
2 SPRAY, SUSPENSION (ML) NASAL DAILY
Qty: 16 G | Refills: 5 | Status: SHIPPED | OUTPATIENT
Start: 2024-12-24

## 2025-09-01 SDOH — ECONOMIC STABILITY: INCOME INSECURITY: IN THE LAST 12 MONTHS, WAS THERE A TIME WHEN YOU WERE NOT ABLE TO PAY THE MORTGAGE OR RENT ON TIME?: NO

## 2025-09-01 SDOH — ECONOMIC STABILITY: FOOD INSECURITY: WITHIN THE PAST 12 MONTHS, THE FOOD YOU BOUGHT JUST DIDN'T LAST AND YOU DIDN'T HAVE MONEY TO GET MORE.: NEVER TRUE

## 2025-09-01 SDOH — ECONOMIC STABILITY: FOOD INSECURITY: WITHIN THE PAST 12 MONTHS, YOU WORRIED THAT YOUR FOOD WOULD RUN OUT BEFORE YOU GOT MONEY TO BUY MORE.: NEVER TRUE

## 2025-09-01 SDOH — ECONOMIC STABILITY: TRANSPORTATION INSECURITY
IN THE PAST 12 MONTHS, HAS THE LACK OF TRANSPORTATION KEPT YOU FROM MEDICAL APPOINTMENTS OR FROM GETTING MEDICATIONS?: NO

## 2025-09-02 ENCOUNTER — OFFICE VISIT (OUTPATIENT)
Dept: PRIMARY CARE CLINIC | Facility: CLINIC | Age: 60
End: 2025-09-02
Payer: COMMERCIAL

## 2025-09-02 VITALS
HEIGHT: 69 IN | BODY MASS INDEX: 37.33 KG/M2 | DIASTOLIC BLOOD PRESSURE: 80 MMHG | RESPIRATION RATE: 16 BRPM | TEMPERATURE: 98.1 F | HEART RATE: 70 BPM | WEIGHT: 252 LBS | OXYGEN SATURATION: 99 % | SYSTOLIC BLOOD PRESSURE: 138 MMHG

## 2025-09-02 DIAGNOSIS — M79.672 BILATERAL FOOT PAIN: ICD-10-CM

## 2025-09-02 DIAGNOSIS — M79.671 BILATERAL FOOT PAIN: ICD-10-CM

## 2025-09-02 DIAGNOSIS — I10 HYPERTENSION, UNSPECIFIED TYPE: ICD-10-CM

## 2025-09-02 DIAGNOSIS — L84 CORN: Primary | ICD-10-CM

## 2025-09-02 PROCEDURE — 17110 DESTRUCTION B9 LES UP TO 14: CPT | Performed by: STUDENT IN AN ORGANIZED HEALTH CARE EDUCATION/TRAINING PROGRAM

## 2025-09-02 PROCEDURE — 3075F SYST BP GE 130 - 139MM HG: CPT | Performed by: STUDENT IN AN ORGANIZED HEALTH CARE EDUCATION/TRAINING PROGRAM

## 2025-09-02 PROCEDURE — 99214 OFFICE O/P EST MOD 30 MIN: CPT | Performed by: STUDENT IN AN ORGANIZED HEALTH CARE EDUCATION/TRAINING PROGRAM

## 2025-09-02 PROCEDURE — 3079F DIAST BP 80-89 MM HG: CPT | Performed by: STUDENT IN AN ORGANIZED HEALTH CARE EDUCATION/TRAINING PROGRAM

## 2025-09-02 RX ORDER — AMLODIPINE AND BENAZEPRIL HYDROCHLORIDE 10; 40 MG/1; MG/1
1 CAPSULE ORAL DAILY
Qty: 90 CAPSULE | Refills: 1 | Status: SHIPPED | OUTPATIENT
Start: 2025-09-02

## 2025-09-02 ASSESSMENT — PATIENT HEALTH QUESTIONNAIRE - PHQ9
2. FEELING DOWN, DEPRESSED OR HOPELESS: NOT AT ALL
SUM OF ALL RESPONSES TO PHQ QUESTIONS 1-9: 0
1. LITTLE INTEREST OR PLEASURE IN DOING THINGS: NOT AT ALL
SUM OF ALL RESPONSES TO PHQ QUESTIONS 1-9: 0

## 2025-09-03 LAB
ALBUMIN SERPL-MCNC: 4.5 G/DL (ref 3.8–4.9)
ALP SERPL-CCNC: 73 IU/L (ref 44–121)
ALT SERPL-CCNC: 18 IU/L (ref 0–44)
AST SERPL-CCNC: 28 IU/L (ref 0–40)
BASOPHILS # BLD AUTO: 0 X10E3/UL (ref 0–0.2)
BASOPHILS NFR BLD AUTO: 1 %
BILIRUB SERPL-MCNC: 0.3 MG/DL (ref 0–1.2)
BUN SERPL-MCNC: 13 MG/DL (ref 6–24)
BUN/CREAT SERPL: 14 (ref 9–20)
CALCIUM SERPL-MCNC: 9.2 MG/DL (ref 8.7–10.2)
CHLORIDE SERPL-SCNC: 103 MMOL/L (ref 96–106)
CHOLEST SERPL-MCNC: 168 MG/DL (ref 100–199)
CO2 SERPL-SCNC: 23 MMOL/L (ref 20–29)
CREAT SERPL-MCNC: 0.95 MG/DL (ref 0.76–1.27)
EGFRCR SERPLBLD CKD-EPI 2021: 93 ML/MIN/1.73
EOSINOPHIL # BLD AUTO: 0.1 X10E3/UL (ref 0–0.4)
EOSINOPHIL NFR BLD AUTO: 3 %
ERYTHROCYTE [DISTWIDTH] IN BLOOD BY AUTOMATED COUNT: 12.8 % (ref 11.6–15.4)
EST. AVERAGE GLUCOSE BLD GHB EST-MCNC: 120 MG/DL
GLOBULIN SER CALC-MCNC: 2.5 G/DL (ref 1.5–4.5)
GLUCOSE SERPL-MCNC: 86 MG/DL (ref 70–99)
HBA1C MFR BLD: 5.8 % (ref 4.8–5.6)
HCT VFR BLD AUTO: 42.4 % (ref 37.5–51)
HDLC SERPL-MCNC: 48 MG/DL
HGB BLD-MCNC: 14 G/DL (ref 13–17.7)
IMM GRANULOCYTES # BLD AUTO: 0 X10E3/UL (ref 0–0.1)
IMM GRANULOCYTES NFR BLD AUTO: 0 %
LDLC SERPL CALC-MCNC: 105 MG/DL (ref 0–99)
LYMPHOCYTES # BLD AUTO: 1.2 X10E3/UL (ref 0.7–3.1)
LYMPHOCYTES NFR BLD AUTO: 31 %
MCH RBC QN AUTO: 28.7 PG (ref 26.6–33)
MCHC RBC AUTO-ENTMCNC: 33 G/DL (ref 31.5–35.7)
MCV RBC AUTO: 87 FL (ref 79–97)
MONOCYTES # BLD AUTO: 0.4 X10E3/UL (ref 0.1–0.9)
MONOCYTES NFR BLD AUTO: 9 %
NEUTROPHILS # BLD AUTO: 2.3 X10E3/UL (ref 1.4–7)
NEUTROPHILS NFR BLD AUTO: 56 %
PLATELET # BLD AUTO: 257 X10E3/UL (ref 150–450)
POTASSIUM SERPL-SCNC: 4.3 MMOL/L (ref 3.5–5.2)
PROT SERPL-MCNC: 7 G/DL (ref 6–8.5)
RBC # BLD AUTO: 4.87 X10E6/UL (ref 4.14–5.8)
SODIUM SERPL-SCNC: 140 MMOL/L (ref 134–144)
TRIGL SERPL-MCNC: 80 MG/DL (ref 0–149)
VLDLC SERPL CALC-MCNC: 15 MG/DL (ref 5–40)
WBC # BLD AUTO: 4 X10E3/UL (ref 3.4–10.8)

## (undated) DEVICE — SUT ETHLN 5-0 18IN PS2 BLK --

## (undated) DEVICE — DISPOSABLE TOURNIQUET CUFF SINGLE BLADDER, DUAL PORT AND QUICK CONNECT CONNECTOR: Brand: COLOR CUFF

## (undated) DEVICE — GOWN,SIRUS,FABRNF,XL,20/CS: Brand: MEDLINE

## (undated) DEVICE — BASIC PACK: Brand: CONVERTORS

## (undated) DEVICE — SUTURE COAT VCRL SZ 4-0 L18IN ABSRB UD L16MM PC-3 3/8 CIR J845G

## (undated) DEVICE — MEDI-VAC NON-CONDUCTIVE SUCTION TUBING: Brand: CARDINAL HEALTH

## (undated) DEVICE — (D)SYR 10ML 1/5ML GRAD NSAF -- PKGING CHANGE USE ITEM 338027

## (undated) DEVICE — SUT ETHLN 5-0 18IN FS2 BLK --

## (undated) DEVICE — SURGICAL PROCEDURE PACK BASIN MAJ SET CUST NO CAUT

## (undated) DEVICE — NEEDLE HYPO 25GA L1.5IN BVL ORIENTED ECLIPSE

## (undated) DEVICE — INFECTION CONTROL KIT SYS

## (undated) DEVICE — STERILE POLYISOPRENE POWDER-FREE SURGICAL GLOVES: Brand: PROTEXIS

## (undated) DEVICE — BANDAGE COMPR 9 FTX4 IN SMOOTH COMFORTABLE SYNTH ESMRK LF

## (undated) DEVICE — HOOK LOCK LATEX FREE ELASTIC BANDAGE 3INX5YD

## (undated) DEVICE — 1200 GUARD II KIT W/5MM TUBE W/O VAC TUBE: Brand: GUARDIAN

## (undated) DEVICE — SYR 3ML LL TIP 1/10ML GRAD --

## (undated) DEVICE — DRAPE,EXTREMITY,89X128,STERILE: Brand: MEDLINE

## (undated) DEVICE — INTENDED FOR TISSUE SEPARATION, AND OTHER PROCEDURES THAT REQUIRE A SHARP SURGICAL BLADE TO PUNCTURE OR CUT.: Brand: BARD-PARKER ® CARBON RIB-BACK BLADES

## (undated) DEVICE — SYR 5ML 1/5 GRAD LL NSAF LF --

## (undated) DEVICE — SYR IRR BLB 2OZ DISP BLU STRL -- CONVERT TO ITEM 357637

## (undated) DEVICE — NEEDLE HYPO 18GA L1.5IN PNK S STL HUB POLYPR SHLD REG BVL

## (undated) DEVICE — BLADE SAW SAG 9.4MM X2 5.5MM --

## (undated) DEVICE — Device: Brand: MICROAIRE®

## (undated) DEVICE — PADDING CST CRMPD 3INX4YD NS --

## (undated) DEVICE — Device

## (undated) DEVICE — GAUZE SPONGES,12 PLY: Brand: CURITY

## (undated) DEVICE — SOLUTION IV 1000ML 0.9% SOD CHL

## (undated) DEVICE — STOCKINETTE: Brand: DEROYAL

## (undated) DEVICE — OCCLUSIVE GAUZE STRIP,3% BISMUTH TRIBROMOPHENATE IN PETROLATUM BLEND: Brand: XEROFORM

## (undated) DEVICE — C-WIRE PAK DOUBLE ENDED ORTHOPAEDIC WIRE, TROCAR, .045" (1.14 MM): Brand: C-WIRE